# Patient Record
Sex: MALE | Race: WHITE | NOT HISPANIC OR LATINO | Employment: UNEMPLOYED | URBAN - METROPOLITAN AREA
[De-identification: names, ages, dates, MRNs, and addresses within clinical notes are randomized per-mention and may not be internally consistent; named-entity substitution may affect disease eponyms.]

---

## 2018-07-01 ENCOUNTER — OFFICE VISIT (OUTPATIENT)
Dept: URGENT CARE | Age: 23
End: 2018-07-01

## 2018-07-01 DIAGNOSIS — H00.015 HORDEOLUM EXTERNUM OF LEFT LOWER EYELID: Primary | ICD-10-CM

## 2018-07-01 PROCEDURE — 99201 OFFICE/OUTPT VISIT,NEW,LEVL I: CPT | Performed by: PHYSICIAN ASSISTANT

## 2018-07-01 RX ORDER — MOXIFLOXACIN 5 MG/ML
1 SOLUTION/ DROPS OPHTHALMIC 3 TIMES DAILY
Qty: 1 BOTTLE | Refills: 0 | Status: SHIPPED | OUTPATIENT
Start: 2018-07-01 | End: 2018-07-08

## 2018-07-01 ASSESSMENT — PAIN SCALES - GENERAL: PAINLEVEL: 0

## 2018-07-28 ENCOUNTER — WALK IN (OUTPATIENT)
Dept: URGENT CARE | Age: 23
End: 2018-07-28

## 2018-07-28 VITALS
DIASTOLIC BLOOD PRESSURE: 72 MMHG | OXYGEN SATURATION: 98 % | SYSTOLIC BLOOD PRESSURE: 108 MMHG | TEMPERATURE: 97.6 F | RESPIRATION RATE: 18 BRPM | HEART RATE: 66 BPM

## 2018-07-28 DIAGNOSIS — H10.9 CONJUNCTIVITIS OF LEFT EYE, UNSPECIFIED CONJUNCTIVITIS TYPE: Primary | ICD-10-CM

## 2018-07-28 PROCEDURE — 99213 OFFICE O/P EST LOW 20 MIN: CPT | Performed by: FAMILY MEDICINE

## 2018-07-28 RX ORDER — MONTELUKAST SODIUM 10 MG/1
10 TABLET ORAL NIGHTLY
COMMUNITY

## 2018-07-28 RX ORDER — LORATADINE 10 MG/1
10 TABLET ORAL DAILY
COMMUNITY

## 2018-07-28 RX ORDER — MOXIFLOXACIN 5 MG/ML
1 SOLUTION/ DROPS OPHTHALMIC 3 TIMES DAILY
Qty: 3 ML | Refills: 0 | Status: SHIPPED | OUTPATIENT
Start: 2018-07-28

## 2018-07-28 ASSESSMENT — ENCOUNTER SYMPTOMS
RHINORRHEA: 0
EYE ITCHING: 1
ACTIVITY CHANGE: 0
APPETITE CHANGE: 0
SINUS PAIN: 0
EYE REDNESS: 1
SINUS PRESSURE: 0
EYE DISCHARGE: 0
FEVER: 0

## 2018-08-10 RX ORDER — MOXIFLOXACIN 5 MG/ML
1 SOLUTION/ DROPS OPHTHALMIC 3 TIMES DAILY
Qty: 3 ML | Refills: 0 | OUTPATIENT
Start: 2018-08-10

## 2021-05-24 VITALS
WEIGHT: 180 LBS | HEIGHT: 71 IN | OXYGEN SATURATION: 98 % | DIASTOLIC BLOOD PRESSURE: 78 MMHG | HEART RATE: 94 BPM | SYSTOLIC BLOOD PRESSURE: 117 MMHG | TEMPERATURE: 98.4 F | BODY MASS INDEX: 25.2 KG/M2 | RESPIRATION RATE: 16 BRPM

## 2022-04-09 ENCOUNTER — APPOINTMENT (OUTPATIENT)
Dept: CT IMAGING | Facility: CLINIC | Age: 27
End: 2022-04-09
Attending: EMERGENCY MEDICINE
Payer: COMMERCIAL

## 2022-04-09 ENCOUNTER — HOSPITAL ENCOUNTER (EMERGENCY)
Facility: CLINIC | Age: 27
Discharge: HOME | End: 2022-04-09
Attending: EMERGENCY MEDICINE | Admitting: EMERGENCY MEDICINE
Payer: COMMERCIAL

## 2022-04-09 VITALS
RESPIRATION RATE: 16 BRPM | HEIGHT: 71 IN | TEMPERATURE: 97.7 F | WEIGHT: 220 LBS | DIASTOLIC BLOOD PRESSURE: 88 MMHG | BODY MASS INDEX: 30.8 KG/M2 | HEART RATE: 92 BPM | SYSTOLIC BLOOD PRESSURE: 138 MMHG | OXYGEN SATURATION: 99 %

## 2022-04-09 DIAGNOSIS — K63.89 EPIPLOIC APPENDAGITIS: ICD-10-CM

## 2022-04-09 LAB
ALBUMIN SERPL-MCNC: 4.3 G/DL (ref 3.4–5)
ALP SERPL-CCNC: 80 U/L (ref 40–150)
ALT SERPL W P-5'-P-CCNC: 38 U/L (ref 0–70)
ANION GAP SERPL CALCULATED.3IONS-SCNC: 8 MMOL/L (ref 3–14)
AST SERPL W P-5'-P-CCNC: 13 U/L (ref 0–45)
BASOPHILS # BLD AUTO: 0 10E3/UL (ref 0–0.2)
BASOPHILS NFR BLD AUTO: 0 %
BILIRUB SERPL-MCNC: 1.1 MG/DL (ref 0.2–1.3)
BUN SERPL-MCNC: 10 MG/DL (ref 7–30)
CALCIUM SERPL-MCNC: 9.3 MG/DL (ref 8.5–10.1)
CHLORIDE BLD-SCNC: 104 MMOL/L (ref 94–109)
CO2 SERPL-SCNC: 24 MMOL/L (ref 20–32)
CREAT SERPL-MCNC: 0.88 MG/DL (ref 0.66–1.25)
EOSINOPHIL # BLD AUTO: 0 10E3/UL (ref 0–0.7)
EOSINOPHIL NFR BLD AUTO: 0 %
ERYTHROCYTE [DISTWIDTH] IN BLOOD BY AUTOMATED COUNT: 12.1 % (ref 10–15)
GFR SERPL CREATININE-BSD FRML MDRD: >90 ML/MIN/1.73M2
GLUCOSE BLD-MCNC: 112 MG/DL (ref 70–99)
HCT VFR BLD AUTO: 48.5 % (ref 40–53)
HGB BLD-MCNC: 16.3 G/DL (ref 13.3–17.7)
HOLD SPECIMEN: NORMAL
HOLD SPECIMEN: NORMAL
IMM GRANULOCYTES # BLD: 0.1 10E3/UL
IMM GRANULOCYTES NFR BLD: 0 %
LIPASE SERPL-CCNC: 82 U/L (ref 73–393)
LYMPHOCYTES # BLD AUTO: 1.5 10E3/UL (ref 0.8–5.3)
LYMPHOCYTES NFR BLD AUTO: 13 %
MCH RBC QN AUTO: 28.3 PG (ref 26.5–33)
MCHC RBC AUTO-ENTMCNC: 33.6 G/DL (ref 31.5–36.5)
MCV RBC AUTO: 84 FL (ref 78–100)
MONOCYTES # BLD AUTO: 0.9 10E3/UL (ref 0–1.3)
MONOCYTES NFR BLD AUTO: 8 %
NEUTROPHILS # BLD AUTO: 9 10E3/UL (ref 1.6–8.3)
NEUTROPHILS NFR BLD AUTO: 79 %
NRBC # BLD AUTO: 0 10E3/UL
NRBC BLD AUTO-RTO: 0 /100
PLATELET # BLD AUTO: 274 10E3/UL (ref 150–450)
POTASSIUM BLD-SCNC: 3.8 MMOL/L (ref 3.4–5.3)
PROT SERPL-MCNC: 8.3 G/DL (ref 6.8–8.8)
RBC # BLD AUTO: 5.75 10E6/UL (ref 4.4–5.9)
SODIUM SERPL-SCNC: 136 MMOL/L (ref 133–144)
WBC # BLD AUTO: 11.4 10E3/UL (ref 4–11)

## 2022-04-09 PROCEDURE — 96375 TX/PRO/DX INJ NEW DRUG ADDON: CPT

## 2022-04-09 PROCEDURE — 99285 EMERGENCY DEPT VISIT HI MDM: CPT | Mod: 25

## 2022-04-09 PROCEDURE — 258N000003 HC RX IP 258 OP 636: Performed by: EMERGENCY MEDICINE

## 2022-04-09 PROCEDURE — 82947 ASSAY GLUCOSE BLOOD QUANT: CPT | Performed by: EMERGENCY MEDICINE

## 2022-04-09 PROCEDURE — 250N000011 HC RX IP 250 OP 636: Performed by: EMERGENCY MEDICINE

## 2022-04-09 PROCEDURE — 96374 THER/PROPH/DIAG INJ IV PUSH: CPT

## 2022-04-09 PROCEDURE — 36415 COLL VENOUS BLD VENIPUNCTURE: CPT | Performed by: EMERGENCY MEDICINE

## 2022-04-09 PROCEDURE — 83690 ASSAY OF LIPASE: CPT | Performed by: EMERGENCY MEDICINE

## 2022-04-09 PROCEDURE — 74177 CT ABD & PELVIS W/CONTRAST: CPT

## 2022-04-09 PROCEDURE — 250N000009 HC RX 250: Performed by: EMERGENCY MEDICINE

## 2022-04-09 PROCEDURE — 85025 COMPLETE CBC W/AUTO DIFF WBC: CPT | Performed by: EMERGENCY MEDICINE

## 2022-04-09 PROCEDURE — 96361 HYDRATE IV INFUSION ADD-ON: CPT

## 2022-04-09 RX ORDER — MORPHINE SULFATE 4 MG/ML
4 INJECTION, SOLUTION INTRAMUSCULAR; INTRAVENOUS
Status: DISCONTINUED | OUTPATIENT
Start: 2022-04-09 | End: 2022-04-09 | Stop reason: HOSPADM

## 2022-04-09 RX ORDER — IOPAMIDOL 755 MG/ML
111 INJECTION, SOLUTION INTRAVASCULAR ONCE
Status: COMPLETED | OUTPATIENT
Start: 2022-04-09 | End: 2022-04-09

## 2022-04-09 RX ORDER — ONDANSETRON 2 MG/ML
4 INJECTION INTRAMUSCULAR; INTRAVENOUS
Status: COMPLETED | OUTPATIENT
Start: 2022-04-09 | End: 2022-04-09

## 2022-04-09 RX ORDER — KETOROLAC TROMETHAMINE 15 MG/ML
15 INJECTION, SOLUTION INTRAMUSCULAR; INTRAVENOUS ONCE
Status: COMPLETED | OUTPATIENT
Start: 2022-04-09 | End: 2022-04-09

## 2022-04-09 RX ADMIN — IOPAMIDOL 111 ML: 755 INJECTION, SOLUTION INTRAVENOUS at 08:09

## 2022-04-09 RX ADMIN — ONDANSETRON 4 MG: 2 INJECTION INTRAMUSCULAR; INTRAVENOUS at 07:31

## 2022-04-09 RX ADMIN — KETOROLAC TROMETHAMINE 15 MG: 15 INJECTION, SOLUTION INTRAMUSCULAR; INTRAVENOUS at 09:09

## 2022-04-09 RX ADMIN — SODIUM CHLORIDE 72 ML: 900 INJECTION INTRAVENOUS at 08:10

## 2022-04-09 RX ADMIN — SODIUM CHLORIDE 1000 ML: 9 INJECTION, SOLUTION INTRAVENOUS at 07:31

## 2022-04-09 ASSESSMENT — ENCOUNTER SYMPTOMS
DIARRHEA: 1
ABDOMINAL PAIN: 1

## 2022-04-09 NOTE — ED PROVIDER NOTES
"  History   Chief Complaint:  Abdominal Pain (LLQ)       HPI   Matty Becker is a 26 year old male who presents with worsening left lower abdominal pain that started 4 days ago. He has never had abdominal pain this severe. He felt better 3 days ago but then ate a big dinner and started getting abdominal pain with diarrhea. He is not able to eat or drink anything. His father had a history of diverticulitis and he thinks he might have it.     Review of Systems   Gastrointestinal: Positive for abdominal pain and diarrhea.   All other systems reviewed and are negative.    Allergies:  The patient has no known allergies.     Medications:  The patient is currently on no regular medications.    Past Medical History:     The patient denies past medical history.     Social History:  The patient presents alone    Physical Exam     Patient Vitals for the past 24 hrs:   BP Temp Temp src Pulse Resp SpO2 Height Weight   04/09/22 0701 (!) 142/102 -- -- 91 -- 99 % -- --   04/09/22 0641 (!) 147/102 97.7  F (36.5  C) Temporal 104 16 97 % 1.803 m (5' 11\") 99.8 kg (220 lb)       Physical Exam  General: Alert and Interactive.   Head: No signs of trauma.   Mouth/Throat: Oropharynx is clear and moist.   Eyes: Conjunctivae are normal. Pupils are equal, round, and reactive to light.   Neck: Normal range of motion. No nuchal rigidity.   CV: Normal rate and regular rhythm.    Resp: Effort normal and breath sounds normal. No respiratory distress.   GI: Soft. No guarding. Left lower quadrant abdominal tenderness  MSK: Normal range of motion. no edema.   Neuro: The patient is alert and oriented to person, place, and time.  PERRLA, EOMI, strength in upper/lower extremities normal and symmetrical.   Sensation normal. Speech normal.  GCS eye subscore is 4. GCS verbal subscore is 5. GCS motor subscore is 6.   Skin: Skin is warm and dry. No rash noted.   Psych: normal mood and affect. behavior is normal.   Emergency Department Course "     Imaging:  CT Abdomen Pelvis w Contrast   Final Result   IMPRESSION:    1.  Acute epiploic appendagitis proximal descending colon.   2.  Mild diffuse hepatic steatosis.   3.  A few small foci of subsegmental loss atelectasis posterior basilar segment left lower lobe.         Report per radiology    Laboratory:  Labs Ordered and Resulted from Time of ED Arrival to Time of ED Departure   COMPREHENSIVE METABOLIC PANEL - Abnormal       Result Value    Sodium 136      Potassium 3.8      Chloride 104      Carbon Dioxide (CO2) 24      Anion Gap 8      Urea Nitrogen 10      Creatinine 0.88      Calcium 9.3      Glucose 112 (*)     Alkaline Phosphatase 80      AST 13      ALT 38      Protein Total 8.3      Albumin 4.3      Bilirubin Total 1.1      GFR Estimate >90     CBC WITH PLATELETS AND DIFFERENTIAL - Abnormal    WBC Count 11.4 (*)     RBC Count 5.75      Hemoglobin 16.3      Hematocrit 48.5      MCV 84      MCH 28.3      MCHC 33.6      RDW 12.1      Platelet Count 274      % Neutrophils 79      % Lymphocytes 13      % Monocytes 8      % Eosinophils 0      % Basophils 0      % Immature Granulocytes 0      NRBCs per 100 WBC 0      Absolute Neutrophils 9.0 (*)     Absolute Lymphocytes 1.5      Absolute Monocytes 0.9      Absolute Eosinophils 0.0      Absolute Basophils 0.0      Absolute Immature Granulocytes 0.1      Absolute NRBCs 0.0     LIPASE - Normal    Lipase 82     ROUTINE UA WITH MICROSCOPIC REFLEX TO CULTURE      Emergency Department Course:    Reviewed:  I reviewed nursing notes, vitals, past medical history and Care Everywhere    Assessments:  0700 I obtained history and examined the patient as noted above.     0844 I rechecked the patient and explained findings.     Interventions:  0731 NaCl Bolus 1,000 mL IV    0731 Zofran 4 mg IV    0810 Saline 72 mL     0855 Toradol 1 mL IV    Disposition:  The patient was discharged to home.     Impression & Plan     Medical Decision Making:  Matty Becker is a 26  year old male who presents with abdominal pain as detailed above.  A broad differential diagnosis was considered including appendicitis, gall bladder disease, pancreatitis, diverticular disease, bowel obstruction, volvulus, intussusception, gastritis and peptic ulcer disease, gastro intestinal infection, inflammatory bowel disease, peritonitis, kidney stones, UTI, mesenteric lymphadenopathy, IBS.    The workup in the ER is at this point negative.  No definitive etiology for the patient s pain is found at this point and my suspicion of an intraabdominal catastrophe or other worrisome etiology is low. CT shows Epiploic appendagitis.   There is no indication for admission at this time for serial exams and further workup. Patient is hemodynamically stable in ER. Plan is home with 12 hour abdominal pain recheck by primary care physician or return to ED at that time. Return for fevers greater than 102, increasing pain, other new symptoms develop.  Abdominal pain instructions given to patient. Questions were answered.  Diagnosis:    ICD-10-CM    1. Epiploic appendagitis  K63.89        Scribe Disclosure:  Nolberto MAE, am serving as a scribe at 6:52 AM on 4/9/2022 to document services personally performed by Brando Bernal MD based on my observations and the provider's statements to me.          Brando Bernal MD  04/09/22 0407

## 2022-04-09 NOTE — DISCHARGE INSTRUCTIONS
Ibuprofen may take every 6 hours.  No more than 3200mg in 24 hours.  Take with food.  Tylenol every 6 hours.  No more than 3000mg in 24 hours.  You received toradol at 0900.  Will need to wait until 3PM to take more ibuprofen/advil.  OTC meds for stool softeners - try miralax or colace.  If you start having loose stools, stop taking.       INTRODUCTION  Epiploic appendages are normal outpouchings of peritoneal fat on the anti-mesenteric surface of the colon. Epiploic appendagitis is a benign and self-limited condition  Inaccurate diagnosis can lead to unnecessary hospitalizations, antibiotic therapy, and surgical intervention.    This topic will review the clinical manifestations, diagnosis, and management of epiploic appendagitis.  DEFINITION:  Epiploic appendagitis is an ischemic infarction of an epiploic appendage caused by torsion or spontaneous thrombosis of the epiploic appendage central draining vein.  EPIDEMIOLOGY:  The true incidence of epiploic appendagitis is not known. However, epiploic appendagitis has been reported in 2 to 7 percent of patients who were initially suspected of having acute diverticulitis and in 0.3 to 1 percent of patients suspected of having acute appendicitis.  Epiploic appendagitis occurs most commonly in the second to fifth decades of life with a mean age at diagnosis of 40 years. The incidence of epiploic appendagitis has been reported to be up to four times higher in men as compared with women. Epiploic appendagitis can arise in any segment of the colon. In surgical case series, 57 percent of cases occur in the rectosigmoid, 26 percent in the ileocecum, 9 percent in the ascending colon, 6 percent in the transverse colon, and 2 percent in the descending colon.  Obesity, an increase in abdominal adipose tissue, and strenuous exercise may be risk factors for the development of epiploic appendagitis.  ANATOMY: Epiploic appendages are small outpouchings of fat-filled, serosa-covered  structures present on the external surface of the colon projecting into the peritoneal cavity. Each appendage encloses small branches of the circular artery and vein that supply the corresponding segment of the colon. Subserosal lymphatic channels either terminate in a lymph node within an appendage or loop through its base en route to mesenteric nodes.  On average, the adult colon has approximately 50 to 100 appendages. Epiploic appendages occur all along the entire colon but are more abundant and larger in the transverse and sigmoid colon. They are usually rudimentary at the base of the appendix. The appendages vary considerably in size, shape, and contour. Most epiploic appendages are 1 to 2 cm thick and 2 to 5 cm long, although they are occasionally up to 15 cm. For unclear reasons, they are largest and most prominent in obese persons and in those who have recently lost weight. Although the exact role of epiploic appendages is not known, they are presumed to serve a protective and defensive mechanism similar to that offered by the greater omentum and may have a role in colonic absorption. They may also act as a cushion, protecting colonic blood supply during peristalsis.  PATHOGENESIS:  Epiploic appendagitis is usually caused by torsion, which occurs when the appendage is abnormally long and large. The vein, which is longer than the artery by virtue of its tortuous course, alters the anatomy such that the pedicle is predisposed to twisting. Acute torsion causes ischemia and infarction with aseptic fat necrosis and spontaneous venous thrombosis. Spontaneous venous thrombosis of a draining vein can also predispose to twisting of the appendage pedicle. Gradual torsion of the appendages can result in chronic inflammation with minimal or no symptoms. By contrast, acute strangulation is associated with the development of symptoms.  CLINICAL MANIFESTATIONS: Clinical presentation -- Patients with epiploic appendagitis  most commonly present with acute or subacute onset of lower abdominal pain. The pain is in the left abdomen in 60 to 80 percent of patients but has also been reported in the right lower quadrant. The pain is often described as a constant, dull, localized pain that does not radiate. Other less frequent symptoms include postprandial fullness, early satiety, vomiting, bloating, diarrhea, and low-grade fever.  On physical examination, patients usually do not appear to be seriously ill and are usually afebrile. The pain is localized to the affected area, and rebound tenderness is usually absent. A mass is palpable in 10 to 30 percent of patients.  Laboratory findings -- The white blood count, erythrocyte sedimentation rate, and C-reactive protein are usually normal but may be mildly elevated.  DIAGNOSIS: Epiploic appendagitis is usually diagnosed incidentally in patients undergoing imaging for acute/subacute onset of lower abdominal pain. The diagnosis should also be considered when exploration of the abdomen fails to reveal any of the more common causes of acute abdominal pain. Abdominal computed tomography (CT) is diagnostic for epiploic appendagitis while excluding other causes of abdominal pain. We reserve the use of abdominal ultrasonography to diagnose epiploic appendagitis when CT scan findings are equivocal or if CT scan is not readily available. Abdominal ultrasound is best suited for patients with a thin body habitus seen when performed at centers with experience in sonographic imaging of the colon.  Abdominal computed tomography scan -- The classic finding of epiploic appendagitis is a 2 to 3 cm, oval-shaped, fat density, paracolic mass with thickened peritoneal lining and periappendageal fat stranding.  A high-attenuated central dot within the inflamed appendage that corresponds to a thrombosed draining appendageal vein is occasionally evident. The fat density ovoid lesion and the central high attenuation  focus within the fatty lesion are also known as the ring sign and the dot sign, respectively. In the absence of inflammation, epiploic appendices are usually not seen on CT scan unless they are surrounded by a sufficient amount of intraperitoneal fluid (eg, ascites or hemoperitoneum) or inflammation. Magnetic resonance imaging (MRI) findings of epiploic appendagitis have not been well studied but appear to correlate with CT findings.  Abdominal ultrasound -- On abdominal ultrasound, the inflamed appendage appears as a noncompressible, solid, hyperechoic ovoid mass with a subtle hypoechoic rim located at the point of maximal tenderness.. The inflamed fatty mass is fixed to the colon and often also to the parietal peritoneum during inspiration and expiration. Doppler studies typically reveal absence of blood flow in the appendage and normal blood flow in the hyperechoic inflamed fat surrounding the appendage.  Contrast-enhanced ultrasound shows a central area of no enhancement with moderately increased vascularization around the avascular necrotic appendage  MANAGEMENT: There are limited data from case reports to guide the management of patients with epiploic appendagitis.  Conservative management -- Patients can be managed conservatively with oral anti-inflammatory medications (eg, ibuprofen 600 mg orally every eight hours for four to six days) and if needed a short course of opioids (acetaminophen/codeine 300/30 every six hours) for four to seven days.  Anti-inflammatories provide analgesia but probably do not modify the disease course. Patients usually do not require hospitalization or antibiotics [38].   Surgery -- We reserve surgical management for patients whose symptoms fail to improve with conservative management, those with new or worsening symptoms (eg, high fever, progressive pain, nausea, vomiting, or inability to tolerate an oral diet), or complications (eg, intussusception, bowel obstruction, abscess)  that cannot be managed nonoperatively. The inflamed appendage should be ligated and resected.  NATURAL HISTORY AND DISEASE COURSE:   Epiploic appendagitis is a benign and self-limiting condition. Complete resolution without surgical intervention usually occurs between 3 to 14 days. The risk of recurrence has not been described but is probably very low. Rarely, inflamed appendages can adhere to the abdominal wall or other viscera predisposing to intestinal obstruction and intussusception. Inflamed and necrotic appendages can also rarely progress to abscess formation.  There are other less common conditions affecting the epiploic appendices. They can slide into a femoral, umbilical, or inguinal hernia sac where they may remain without causing symptoms or (less commonly) incarcerate with or without torsion.  The appendices can also calcify, cast off, and lie free as foreign bodies (corpora aliena) in the peritoneal cavity or become surrounded by omental adhesions. Epiploic appendages are thought to represent the most frequent source of intraperitoneal loose bodies, which are usually found in the pelvis. These may become secondarily attached elsewhere in the abdomen and can be confused with a neoplastic process.  SUMMARY AND RECOMMENDATIONS:    Epiploic appendagitis, also known as appendicitis epiploica, hemorrhagic epiploitis, epiplopericolitis, or appendagitis, is an ischemic infarction of an epiploic appendage caused by torsion or spontaneous thrombosis of the epiploic appendage central draining vein.  ?The incidence of epiploic appendagitis is not known. However, epiploic appendagitis has been reported in 2 to 7 percent of patients who were suspected of having diverticulitis and in 0.3 to 1 percent of patients suspected of having appendicitis. Epiploic appendagitis occurs most commonly in the second to fifth decades of life with a mean age at diagnosis of 40 years. The incidence of epiploic appendagitis is up to  four times higher in men as compared with women. Obesity and strenuous exercise may be risk factors for the development of epiploic appendagitis. (See 'Epidemiology' above.)    ?Epiploic appendagitis is usually caused by acute torsion, which occurs when the appendage is abnormally long and large. Acute torsion causes ischemia and infarction with aseptic fat necrosis and spontaneous venous thrombosis. Gradual torsion of the appendages can result in chronic inflammation with minimal or no symptoms. (See 'Pathogenesis' above.)    ?Patients most commonly present with acute or subacute lower abdominal pain. Less frequent symptoms include postprandial fullness, early satiety, vomiting, bloating, diarrhea, and low-grade fever. The white blood count with differential, erythrocyte sedimentation rate, and C-reactive protein are normal or mildly elevated. (See 'Clinical manifestations' above.)    ?The differential diagnosis of epiploic appendagitis consists of other causes of acute onset of lower abdominal pain (table 1). Of these, epiploic appendagitis is most often confused with acute diverticulitis and acute appendicitis. These conditions can usually be differentiated by the clinical presentation and by abdominal imaging (eg, abdominal computed tomography [CT] scan). (See 'Differential diagnosis' above.)    ?Epiploic appendagitis is usually diagnosed incidentally in patients undergoing imaging for acute onset of lower abdominal pain. Abdominal CT scan is diagnostic. The classic finding is a 2 to 3 cm, oval-shaped, fat density, paracolic mass with thickened peritoneal lining and periappendageal fat stranding (image 1) [11,21-26]. A high-attenuated central dot within the inflamed appendage that corresponds to a thrombosed draining appendageal vein is occasionally evident. (See 'Diagnosis' above.)    ?We recommend initial conservative management rather than surgery in patients with epiploic appendagitis confirmed by abdominal  imaging (Grade 1C). We typically treat patients with oral anti-inflammatory medications (eg, ibuprofen 600 mg PO every eight hours for four to six days) and if needed a short course of opiates (acetaminophen/codeine 300/30 every six hours) for four to seven days. We reserve surgical management for patients whose symptoms fail to improve with conservative management, those with new or worsening symptoms (eg, high fever, progressive pain, nausea, vomiting, or inability to tolerate an oral diet), or complications (eg, intussusception, bowel obstruction, abscess) that cannot be managed nonoperatively. (See 'Management' above.)    ?Epiploic appendagitis is a benign and self-limiting condition. Complete resolution without surgical intervention usually occurs between 3 to 14 days. Complications (eg, intussusception, bowel obstruction, abscess) are rare. (See 'Natural history and disease course' above.)  Use of [employer] is subject to the Terms of Use.

## 2022-04-09 NOTE — ED TRIAGE NOTES
Patient presents to the ED with complaints of LLQ abdominal pain that began Tuesday, worsening last night p[rompting him to come to the Ed this morning. Patient stated that he had watery diarrhea on Thursday, then has not had a BM since then. He also says that some nausea developed this morning as well.

## 2022-07-24 ENCOUNTER — HEALTH MAINTENANCE LETTER (OUTPATIENT)
Age: 27
End: 2022-07-24

## 2022-10-03 ENCOUNTER — HEALTH MAINTENANCE LETTER (OUTPATIENT)
Age: 27
End: 2022-10-03

## 2023-08-13 ENCOUNTER — HEALTH MAINTENANCE LETTER (OUTPATIENT)
Age: 28
End: 2023-08-13

## 2024-10-06 ENCOUNTER — HEALTH MAINTENANCE LETTER (OUTPATIENT)
Age: 29
End: 2024-10-06

## 2025-07-17 ENCOUNTER — APPOINTMENT (OUTPATIENT)
Dept: GENERAL RADIOLOGY | Facility: CLINIC | Age: 30
End: 2025-07-17
Attending: EMERGENCY MEDICINE
Payer: COMMERCIAL

## 2025-07-17 ENCOUNTER — HOSPITAL ENCOUNTER (EMERGENCY)
Facility: CLINIC | Age: 30
Discharge: HOME | End: 2025-07-17
Attending: EMERGENCY MEDICINE
Payer: COMMERCIAL

## 2025-07-17 VITALS
DIASTOLIC BLOOD PRESSURE: 81 MMHG | RESPIRATION RATE: 17 BRPM | TEMPERATURE: 98.1 F | HEIGHT: 72 IN | OXYGEN SATURATION: 94 % | HEART RATE: 76 BPM | SYSTOLIC BLOOD PRESSURE: 121 MMHG | BODY MASS INDEX: 33.86 KG/M2 | WEIGHT: 250 LBS

## 2025-07-17 DIAGNOSIS — N23 RENAL COLIC: ICD-10-CM

## 2025-07-17 LAB
ALBUMIN UR-MCNC: 30 MG/DL
ANION GAP SERPL CALCULATED.3IONS-SCNC: 14 MMOL/L (ref 7–15)
APPEARANCE UR: ABNORMAL
BASOPHILS # BLD AUTO: 0 10E3/UL (ref 0–0.2)
BASOPHILS NFR BLD AUTO: 0 %
BILIRUB UR QL STRIP: NEGATIVE
BUN SERPL-MCNC: 15.3 MG/DL (ref 6–20)
CALCIUM SERPL-MCNC: 9.1 MG/DL (ref 8.8–10.4)
CHLORIDE SERPL-SCNC: 103 MMOL/L (ref 98–107)
COLOR UR AUTO: YELLOW
CREAT SERPL-MCNC: 0.99 MG/DL (ref 0.67–1.17)
EGFRCR SERPLBLD CKD-EPI 2021: >90 ML/MIN/1.73M2
EOSINOPHIL # BLD AUTO: 0.1 10E3/UL (ref 0–0.7)
EOSINOPHIL NFR BLD AUTO: 0 %
ERYTHROCYTE [DISTWIDTH] IN BLOOD BY AUTOMATED COUNT: 12.3 % (ref 10–15)
GLUCOSE SERPL-MCNC: 159 MG/DL (ref 70–99)
GLUCOSE UR STRIP-MCNC: NEGATIVE MG/DL
HCO3 SERPL-SCNC: 23 MMOL/L (ref 22–29)
HCT VFR BLD AUTO: 43.1 % (ref 40–53)
HGB BLD-MCNC: 15.2 G/DL (ref 13.3–17.7)
HGB UR QL STRIP: ABNORMAL
IMM GRANULOCYTES # BLD: 0.1 10E3/UL
IMM GRANULOCYTES NFR BLD: 1 %
KETONES UR STRIP-MCNC: ABNORMAL MG/DL
LEUKOCYTE ESTERASE UR QL STRIP: NEGATIVE
LYMPHOCYTES # BLD AUTO: 1.6 10E3/UL (ref 0.8–5.3)
LYMPHOCYTES NFR BLD AUTO: 15 %
MCH RBC QN AUTO: 29.3 PG (ref 26.5–33)
MCHC RBC AUTO-ENTMCNC: 35.3 G/DL (ref 31.5–36.5)
MCV RBC AUTO: 83 FL (ref 78–100)
MONOCYTES # BLD AUTO: 0.6 10E3/UL (ref 0–1.3)
MONOCYTES NFR BLD AUTO: 6 %
MUCOUS THREADS #/AREA URNS LPF: PRESENT /LPF
NEUTROPHILS # BLD AUTO: 8.7 10E3/UL (ref 1.6–8.3)
NEUTROPHILS NFR BLD AUTO: 78 %
NITRATE UR QL: NEGATIVE
NRBC # BLD AUTO: 0 10E3/UL
NRBC BLD AUTO-RTO: 0 /100
PH UR STRIP: 5.5 [PH] (ref 5–7)
PLATELET # BLD AUTO: 251 10E3/UL (ref 150–450)
POTASSIUM SERPL-SCNC: 4.2 MMOL/L (ref 3.4–5.3)
RBC # BLD AUTO: 5.19 10E6/UL (ref 4.4–5.9)
RBC URINE: >182 /HPF
SODIUM SERPL-SCNC: 140 MMOL/L (ref 135–145)
SP GR UR STRIP: 1.03 (ref 1–1.03)
SQUAMOUS EPITHELIAL: <1 /HPF
UROBILINOGEN UR STRIP-MCNC: 2 MG/DL
WBC # BLD AUTO: 11.1 10E3/UL (ref 4–11)
WBC URINE: 6 /HPF

## 2025-07-17 PROCEDURE — 258N000003 HC RX IP 258 OP 636: Performed by: EMERGENCY MEDICINE

## 2025-07-17 PROCEDURE — 96361 HYDRATE IV INFUSION ADD-ON: CPT | Performed by: EMERGENCY MEDICINE

## 2025-07-17 PROCEDURE — 99285 EMERGENCY DEPT VISIT HI MDM: CPT | Mod: 25 | Performed by: EMERGENCY MEDICINE

## 2025-07-17 PROCEDURE — 250N000013 HC RX MED GY IP 250 OP 250 PS 637: Performed by: EMERGENCY MEDICINE

## 2025-07-17 PROCEDURE — 82310 ASSAY OF CALCIUM: CPT | Performed by: EMERGENCY MEDICINE

## 2025-07-17 PROCEDURE — 85014 HEMATOCRIT: CPT | Performed by: EMERGENCY MEDICINE

## 2025-07-17 PROCEDURE — 74019 RADEX ABDOMEN 2 VIEWS: CPT

## 2025-07-17 PROCEDURE — 96374 THER/PROPH/DIAG INJ IV PUSH: CPT | Performed by: EMERGENCY MEDICINE

## 2025-07-17 PROCEDURE — 250N000011 HC RX IP 250 OP 636: Performed by: EMERGENCY MEDICINE

## 2025-07-17 PROCEDURE — 81001 URINALYSIS AUTO W/SCOPE: CPT | Performed by: EMERGENCY MEDICINE

## 2025-07-17 PROCEDURE — 36415 COLL VENOUS BLD VENIPUNCTURE: CPT | Performed by: EMERGENCY MEDICINE

## 2025-07-17 RX ORDER — OXYCODONE HYDROCHLORIDE 5 MG/1
5 TABLET ORAL EVERY 6 HOURS PRN
Qty: 6 TABLET | Refills: 0 | Status: SHIPPED | OUTPATIENT
Start: 2025-07-17 | End: 2025-07-20

## 2025-07-17 RX ORDER — TAMSULOSIN HYDROCHLORIDE 0.4 MG/1
0.4 CAPSULE ORAL
Qty: 10 CAPSULE | Refills: 0 | Status: SHIPPED | OUTPATIENT
Start: 2025-07-17

## 2025-07-17 RX ORDER — ONDANSETRON 2 MG/ML
4 INJECTION INTRAMUSCULAR; INTRAVENOUS EVERY 30 MIN PRN
Status: DISCONTINUED | OUTPATIENT
Start: 2025-07-17 | End: 2025-07-17 | Stop reason: HOSPADM

## 2025-07-17 RX ORDER — ACETAMINOPHEN 500 MG
1000 TABLET ORAL ONCE
Status: COMPLETED | OUTPATIENT
Start: 2025-07-17 | End: 2025-07-17

## 2025-07-17 RX ORDER — ONDANSETRON 4 MG/1
4 TABLET, ORALLY DISINTEGRATING ORAL EVERY 8 HOURS PRN
Qty: 10 TABLET | Refills: 0 | Status: SHIPPED | OUTPATIENT
Start: 2025-07-17

## 2025-07-17 RX ADMIN — ONDANSETRON 4 MG: 2 INJECTION, SOLUTION INTRAMUSCULAR; INTRAVENOUS at 05:10

## 2025-07-17 RX ADMIN — ACETAMINOPHEN 1000 MG: 500 TABLET, FILM COATED ORAL at 05:11

## 2025-07-17 RX ADMIN — SODIUM CHLORIDE, SODIUM LACTATE, POTASSIUM CHLORIDE, AND CALCIUM CHLORIDE 1000 ML: .6; .31; .03; .02 INJECTION, SOLUTION INTRAVENOUS at 05:11

## 2025-07-17 ASSESSMENT — COLUMBIA-SUICIDE SEVERITY RATING SCALE - C-SSRS
6. HAVE YOU EVER DONE ANYTHING, STARTED TO DO ANYTHING, OR PREPARED TO DO ANYTHING TO END YOUR LIFE?: NO
1. IN THE PAST MONTH, HAVE YOU WISHED YOU WERE DEAD OR WISHED YOU COULD GO TO SLEEP AND NOT WAKE UP?: NO
2. HAVE YOU ACTUALLY HAD ANY THOUGHTS OF KILLING YOURSELF IN THE PAST MONTH?: NO

## 2025-07-17 ASSESSMENT — ACTIVITIES OF DAILY LIVING (ADL)
ADLS_ACUITY_SCORE: 41

## 2025-07-17 NOTE — DISCHARGE INSTRUCTIONS
As we discussed, your symptoms are consistent with a kidney stone, although difficult to make formal diagnosis without the CT scan.  Please follow-up with your primary care doctor, and follow-up with urology.  Please return to the hospital if you have worsening pain, fevers, or any further acute concerns.  You can take both tylenol, 1000mg, and ibuprofen, 600mg, together every 6 hours as needed for pain.

## 2025-07-17 NOTE — ED PROVIDER NOTES
Emergency Department Note      History of Present Illness     Chief Complaint   Flank Pain      HPI   Matty Becker is a 30 year old male presents with right-sided flank pain, frequent urination, blood in his urine.  Reports that symptoms started overnight and increased in nature.  He reports having nausea and vomiting secondary to the pain.  He tried taking ibuprofen a couple of times and ultimately states that he feels like it is helping.    Independent Historian   None    Review of External Notes   None    Past Medical History     Medical History and Problem List   No past medical history on file.    Medications   ondansetron (ZOFRAN ODT) 4 MG ODT tab  oxyCODONE (ROXICODONE) 5 MG tablet  tamsulosin (FLOMAX) 0.4 MG capsule        Surgical History   No past surgical history on file.    Physical Exam     Patient Vitals for the past 24 hrs:   BP Temp Temp src Pulse Resp SpO2 Height Weight   07/17/25 0506 (!) 131/91 -- -- 87 -- 95 % -- --   07/17/25 0448 137/83 98.1  F (36.7  C) Tympanic 82 16 96 % 1.829 m (6') 113.4 kg (250 lb)     Physical Exam  General: Does not appear in acute distress  Head: No signs of trauma.   CV: Normal rate and regular rhythm.    Resp: Effort normal and breath sounds normal. No respiratory distress.   GI: Soft. There is no tenderness with palpation.  No rebound or guarding.  Normal bowel sounds.  No CVA tenderness.  MSK: Normal range of motion.   Neuro: The patient is alert and oriented. Speech normal.  Skin: Skin is warm and dry. No rash noted.   Psych: normal mood and affect. behavior is normal.       Diagnostics     Lab Results   Labs Ordered and Resulted from Time of ED Arrival to Time of ED Departure   BASIC METABOLIC PANEL - Abnormal       Result Value    Sodium 140      Potassium 4.2      Chloride 103      Carbon Dioxide (CO2) 23      Anion Gap 14      Urea Nitrogen 15.3      Creatinine 0.99      GFR Estimate >90      Calcium 9.1      Glucose 159 (*)    ROUTINE UA WITH  MICROSCOPIC REFLEX TO CULTURE - Abnormal    Color Urine Yellow      Appearance Urine Slightly Cloudy (*)     Glucose Urine Negative      Bilirubin Urine Negative      Ketones Urine Trace (*)     Specific Gravity Urine 1.035      Blood Urine Large (*)     pH Urine 5.5      Protein Albumin Urine 30 (*)     Urobilinogen Urine 2.0 (*)     Nitrite Urine Negative      Leukocyte Esterase Urine Negative      Mucus Urine Present (*)     RBC Urine >182 (*)     WBC Urine 6 (*)     Squamous Epithelials Urine <1     CBC WITH PLATELETS AND DIFFERENTIAL - Abnormal    WBC Count 11.1 (*)     RBC Count 5.19      Hemoglobin 15.2      Hematocrit 43.1      MCV 83      MCH 29.3      MCHC 35.3      RDW 12.3      Platelet Count 251      % Neutrophils 78      % Lymphocytes 15      % Monocytes 6      % Eosinophils 0      % Basophils 0      % Immature Granulocytes 1      NRBCs per 100 WBC 0      Absolute Neutrophils 8.7 (*)     Absolute Lymphocytes 1.6      Absolute Monocytes 0.6      Absolute Eosinophils 0.1      Absolute Basophils 0.0      Absolute Immature Granulocytes 0.1      Absolute NRBCs 0.0         Imaging   POC US ABDOMEN LIMITED   Final Result   [facility] Procedure Note     Limited Bedside ED Renal Ultrasound:      PERFORMED BY: Dr. Mitch Austin MD   INDICATIONS:  Flank Pain   PROBE: Low frequency convex probe   BODY LOCATION:  Abdomen   FINDINGS:  The ultrasound was performed with longitudinal and transverse views.    Right Kidney:    Hydronephrosis:  Small    Renal cyst:  None   Left Kidney:    Hydronephrosis:  None    Renal cyst:  None   INTERPRETATION:  Right kidney demonstrates minimal hydronephrosis.   IMAGE DOCUMENTATION: Images were archived to PACs system.            Abdomen XR, 2 vw, flat and upright    (Results Pending)           ED Course      Medications Administered   Medications   lactated ringers BOLUS 1,000 mL (1,000 mLs Intravenous $New Bag 7/17/25 0511)   ondansetron (ZOFRAN) injection 4 mg (4 mg  Intravenous $Given 7/17/25 0510)   acetaminophen (TYLENOL) tablet 1,000 mg (1,000 mg Oral $Given 7/17/25 0511)       Procedures   Procedures     Discussion of Management   None    ED Course   ED Course as of 07/17/25 0608   Thu Jul 17, 2025   0595 Patient's pain is well-controlled.  We discussed the pros and cons of doing a CT scan, but the patient declines to do this secondary to cost.         Additional Documentation  Social Determinants of Health: Financial InsecurityConcerned regarding deductible and doing CT    Medical Decision Making / Diagnosis     CMS Diagnoses: None    MIPS   None               MDM   Matty Becker is a 30 year old male who presents with right flank pain, blood in his urine, and frequent urination.  He states the symptoms began overnight he had some associated nausea and vomiting.  He is able take ibuprofen and upon arrival to the ER states he is actually feeling better.  He had a reassuring physical exam.  Blood work and urine were obtained and urine did show red blood cells but no signs of infection.  I discussed that his presentation is very consistent with a kidney stone.  I recommended doing a CT scan which I had originally ordered.  Patient then stated concerns for doing CT scan related to the cost.  I discussed the pros and cons and discussed that CT scan is the most effective way to obtain a definitive diagnosis since it does not have a history of kidney stones and to rule out other pathology.  Patient stated his understanding, but declined this.  He did agree to do a bedside ultrasound which showed findings of some minimal hydronephrosis on the right side.  I also ordered an x-ray to see if a ureteral stone is present, but I discussed that this is not nearly as specific or sensitive.  Patient was signed out with the x-ray result pending.  If this does not show any concerning pathology, the patient can be discharged home with return precautions for significant pain, fevers, or  any further concerns and with follow-up instructions.       Disposition   Care of the patient was transferred to my colleague Dr. Bernal pending XR.     Diagnosis     ICD-10-CM    1. Renal colic  N23 Adult Urology  Referral           Discharge Medications   New Prescriptions    ONDANSETRON (ZOFRAN ODT) 4 MG ODT TAB    Take 1 tablet (4 mg) by mouth every 8 hours as needed for nausea.    OXYCODONE (ROXICODONE) 5 MG TABLET    Take 1 tablet (5 mg) by mouth every 6 hours as needed.    TAMSULOSIN (FLOMAX) 0.4 MG CAPSULE    Take 1 capsule (0.4 mg) by mouth nightly as needed (flank pain).          Mitch Austin MD  07/17/25 0608       Mitch Austin MD  07/17/25 0615

## 2025-07-17 NOTE — ED TRIAGE NOTES
Pt c/o R sided flank starting yesterday. Endorses N&V, urinary frequency and hematuria.      Triage Assessment (Adult)       Row Name 07/17/25 1014          Triage Assessment    Airway WDL WDL        Respiratory WDL    Respiratory WDL WDL        Skin Circulation/Temperature WDL    Skin Circulation/Temperature WDL WDL        Cardiac WDL    Cardiac WDL WDL        Peripheral/Neurovascular WDL    Peripheral Neurovascular WDL WDL        Cognitive/Neuro/Behavioral WDL    Cognitive/Neuro/Behavioral WDL WDL